# Patient Record
(demographics unavailable — no encounter records)

---

## 2024-11-26 NOTE — PHYSICAL EXAM
[Examination Of The Breasts] : a normal appearance [Normal] : normal [No Masses] : no breast masses were palpable [FreeTextEntry2] : JAYLIN

## 2024-11-26 NOTE — HISTORY OF PRESENT ILLNESS
[FreeTextEntry1] : here for her ANNUAL LAST VISIT WITH ME WAS 7 YEARS AGO HAVING BLEEDING HX OF ABLATION X 2 AND SCARRED ENDOMETRIUM, DIFFICULT TO SAMPLE...2016  LAST ANTONELLA NOTE:  Patient Name	LAKISHA BURRELL (52yo, F) ID# [id number]	Appt. Date/Time	2017 07:15PM 	1964	Service Dept.	[facility] OFFICE Provider	MICHAEL GARCÍA MD Insurance	 Med Primary: [employer] Insurance # : [id number] Policy/Group # : [id number] Prescription: OPTUMCOM - Member is ineligible. Patient found on payor's files, but not covered on date of inquiry. details Chief Complaint 6 MONTH CHECK Patient's Care Team Primary Care Provider: DONTE GARCIA MD: [address] [place] [address], Ph [phone], Fax [phone] NPI: [id number] Patient's Pharmacies [address] (ERX): [address] [place] [address], Ph [phone], Fax [phone] Vitals 2017 07:45 pm Ht:	5 ft 9 in Wt:	203 lbs BMI:	30 BP:	122/76 sitting Allergies Reviewed Allergies CLEOCIN: Diarrhea	 DEMEROL	 ******CANNOT USE HORMONES DUE TO HX OF DVT WHILE ON OC TX****** Medications Reviewed Medications Cleocin 100 mg vaginal suppository Insert 1 suppositor(y/ies) every day by vaginal route at bedtime for 3 days. 16   filled	OPTUMRX clotrimazole-betamethasone 1 %-0.05 % topical cream Apply 2 g twice a day by topical route as directed for 7 days. 16   filled	OPTUMRX ergocalciferol (vitamin D2) 50,000 unit capsule 14   filled	OPTUMRX fluconazole 200 mg tablet Take 1 tablet(s) every day by oral route as directed for 3 days. 16   filled	OPTUMRX fluticasone 50 mcg/actuation nasal spray,suspension 16   filled	OPTUMRX metoprolol succinate ER 50 mg tablet,extended release 24 hr 14   filled	OPTUMRX metoprolol tartrate 25 mg tablet 16   filled	OPTUMRX Metrogel Vaginal 0.75 % Insert 1 applicator(s)ful twice a day by vaginal route for 5 days. 17   prescribed	Michael García MD metroNIDAZOLE 500 mg tablet Take 1 tablet(s) twice a day by oral route as directed for 7 days. 16   filled	OPTUMRX Nasonex 50 mcg/actuation Spray 14   filled	OPTUMRX ofloxacin 0.3 % ear drops 14   filled	OPTUMRX PARoxetine 10 mg tablet Take 1 tablet(s) every day by oral route for 90 days. 17   prescribed	Michael García MD Problems Reviewed Problems Vaginitis and vulvovaginitis Endometriosis of uterus Cyst of ovary Menorrhagia - changing 5-6x per day Irregular periods Urogenital finding Abnormal cervical Papanicolaou smear with positive human papillomavirus deoxyribonucleic acid test Family History Reviewed Family History Mother	- Malignant neoplastic disease (onset age: 63) ( age: 73) - lymphoma (previously recorded as Cancer) Paternal Grandmother	- Malignant tumor of ovary - previously recorded as Ovarian Cancer Father	- Well adult Social History Reviewed Social History Smoking Status: Never smoker Surgical History Surgical History not reviewed (last reviewed 2017) Dilation and Curettage Endometrial Ablation - x2 Other - back and knee GYN History Reviewed GYN History LMP: Approximate. Date of LMP: (Notes: 2015). Obstetric History Reviewed Obstetric History TOTAL	FULL	PRE	AB. I	AB. S	ECTOPICS	MULTIPLE	LIVING 0							 Past Medical History Past Medical History not reviewed (last reviewed 10/17/2016) Asthma: Y Notes: HX OF THROMBOPHLEBITIS RIGHT LEG WHILE ON BIRTH CONTROL PILLS back issues Screening None recorded. HPI PAp  ROS Additionally reports: pap  Physical Exam Patient is a 53-year-old female.  Female Genitalia: Vulva: no masses or lesions and vulvar atrophy. Bladder/Urethra: no urethral discharge or mass and normal meatus and bladder non distended. Vagina no erythema, cystocele, rectocele, abnormal vaginal discharge, or vesicle(s) or ulcers and tenderness; introital stenosis. Cervix: no discharge or cervical motion tenderness and grossly normal; atrophic...expect no ECC. Uterus: normal size and shape and midline, mobile, non-tender, and no uterine prolapse. Adnexa/Parametria: no parametrial tenderness or mass and no adnexal tenderness or ovarian mass.  Breast: Inspection/Palpation: no tenderness, skin changes, abnormal secretions, or distinct masses and normal nipple appearance and non tender axillary lymph nodes.  Abdomen: Auscultation/Inspection/Palpation: no tenderness, hepatomegaly, splenomegaly, masses, or CVA tenderness and soft, non-distended, and normal bowel sounds. Hernia: none palpated.  Constitutional: General Appearance: healthy-appearing, well-nourished, and well-developed.  Psychiatric: Orientation: to time, place, and person. Mood and Affect: normal mood and affect and active and alert.  Skin: Appearance: no rashes or lesions.  Neck: Neck: supple, FROM, trachea midline, and no masses. Thyroid: no enlargement or nodules and non-tender.  Lungs: Respiratory Effort: no intercostal retractions or accessory muscle usage. Auscultation: no wheezing, rales/crackles, or rhonchi and clear to auscultation.  Cardiovascular: Auscultation: RRR and no murmur. Peripheral Vascular: no varicosities, LLE edema, RLE edema, calf tenderness, or palpable cords and pedal pulses intact.  Lymph Nodes: Palpation: non tender submandibular nodes or inguinal nodes.  Rectal Exam: Rectum: normal perianal skin and sphincter tone and no hemorrhoids or masses. Assessment / Plan 1. Abnormal cervical Papanicolaou smear with positive human papillomavirus deoxyribonucleic acid test - paps q 6 mths  R87.619: Unspecified abnormal cytological findings in specimens from cervix uteri  2. Vaginitis and vulvovaginitis - CULTURES DONE, TREAT AS NECESSARY  N76.0: Acute vaginitis  3. Menopausal and postmenopausal disorders N95.9: Unspecified menopausal and perimenopausal disorder   Return to Office to see Michael García MD for Well-Woman Visit at [facility] OFFICE on or around 2018 Encounter Sign-Off Encounter signed-off by Michael García MD, 2017. Encounter performed and documented by Michael García MD Encounter reviewed & signed by Michael García MD on 2017 at 8:03pm

## 2024-11-26 NOTE — PROCEDURE
[FreeTextEntry3] : HX OF 2 ABLATIONS NEEDS SAMPLING WILL BE DIFFICULT DUE TO SCAR TISSUE NO FREE FLUID CERVIX NORMAL [FreeTextEntry5] : 60 CC , 11 MM [FreeTextEntry7] : 2.6 CC [FreeTextEntry8] : 2.9  CC

## 2025-01-14 NOTE — PROCEDURE
[Hysteroscopy] : Hysteroscopy [Time out performed] : Pre-procedure time out performed.  Patient's name, date of birth and procedure confirmed. [Consent Obtained] : Consent obtained [Postmenopausal bleeding] : postmenopausal bleeding [Risks] : risks [Benefits] : benefits [Alternatives] : alternatives [Patient] : patient [Infection] : infection [Bleeding] : bleeding [Allergic Reaction] : allergic reaction [rigid] : Using aseptic technique a hysteroscopy was performed using a rigid hysteroscope [Sent to Pathology] : specimen was placed in buffered formalin and sent for pathology [Hemostasis obtained] : hemostasis obtained [Tolerated Well] : Patient tolerated the procedure well [Aftercare instructions/regstrictions given and follow-up scheduled] : Aftercare instructions/restrictions given and follow-up scheduled [Antibiotics given] : antibiotics not given [de-identified] :    moderate polypoid tissue  os zeina/matt dilators sounded to 8 cm medium sized curette neither ostia visualized...scar tissue due to ablation uncomplicated

## 2025-01-28 NOTE — PHYSICAL EXAM
[Chaperone Present] : A chaperone was present in the examining room during all aspects of the physical examination [[id number]] : A chaperone was present during the pelvic exam. [Labia Majora] : normal [Labia Minora] : normal [Normal] : normal [Uterine Adnexae] : normal [FreeTextEntry2] : kristine

## 2025-01-28 NOTE — PROCEDURE
[Abnormal Uterine Bleeding] : abnormal uterine bleeding [Transvaginal Ultrasound] : transvaginal ultrasound [FreeTextEntry9] : POLYP [FreeTextEntry3] : POLYP; TRACE CIRC: 2.17 CM NO FREE FLUID CERVIX NORMAL [FreeTextEntry5] : 48 CC VOL, 6 MM [FreeTextEntry7] : 1.3 CC [FreeTextEntry8] : 2.2 CC

## 2025-01-28 NOTE — HISTORY OF PRESENT ILLNESS
[FreeTextEntry1] : S/P D AND C  BENIGN POLYP, BUT THERE WAS SCAR TISSUE...SO HERE TO SEE IF THE ENTIRE WPOLYP WAS REMOVED HAD BLEEDING HX OF ABLATION X 2 AND SCARRED ENDOMETRIUM, DIFFICULT TO SAMPLE...  LAST ANTONELLA NOTE:  Patient Name	LAKISHA BURRELL (52yo, F) ID# [id number]	Appt. Date/Time	2017 07:15PM 	1964	Service Dept.	[facility] OFFICE Provider	MICHAEL GARCÍA MD Insurance	 Med Primary: [employer] Insurance # : [id number] Policy/Group # : [id number] Prescription: OPTUMCOM - Member is ineligible. Patient found on payor's files, but not covered on date of inquiry. details Chief Complaint 6 MONTH CHECK Patient's Care Team Primary Care Provider: DONTE GARCIA MD: [address] [place] [address], Ph [phone], Fax [phone] NPI: [id number] Patient's Pharmacies [address] (ERX): [facility] [address], Ph [phone], Fax [phone] Vitals 2017 07:45 pm Ht:	5 ft 9 in Wt:	203 lbs BMI:	30 BP:	122/76 sitting Allergies Reviewed Allergies CLEOCIN: Diarrhea	 DEMEROL	 ******CANNOT USE HORMONES DUE TO HX OF DVT WHILE ON OC TX****** Medications Reviewed Medications Cleocin 100 mg vaginal suppository Insert 1 suppositor(y/ies) every day by vaginal route at bedtime for 3 days. 16   filled	OPTUMRX clotrimazole-betamethasone 1 %-0.05 % topical cream Apply 2 g twice a day by topical route as directed for 7 days. 16   filled	OPTUMRX ergocalciferol (vitamin D2) 50,000 unit capsule 14   filled	OPTUMRX fluconazole 200 mg tablet Take 1 tablet(s) every day by oral route as directed for 3 days. 16   filled	OPTUMRX fluticasone 50 mcg/actuation nasal spray,suspension 16   filled	OPTUMRX metoprolol succinate ER 50 mg tablet,extended release 24 hr 14   filled	OPTUMRX metoprolol tartrate 25 mg tablet 16   filled	OPTUMRX Metrogel Vaginal 0.75 % Insert 1 applicator(s)ful twice a day by vaginal route for 5 days. 17   prescribed	Michael García MD metroNIDAZOLE 500 mg tablet Take 1 tablet(s) twice a day by oral route as directed for 7 days. 16   filled	OPTUMRX Nasonex 50 mcg/actuation Spray 14   filled	OPTUMRX ofloxacin 0.3 % ear drops 14   filled	OPTUMRX PARoxetine 10 mg tablet Take 1 tablet(s) every day by oral route for 90 days. 17   prescribed	Michael García MD Problems Reviewed Problems Vaginitis and vulvovaginitis Endometriosis of uterus Cyst of ovary Menorrhagia - changing 5-6x per day Irregular periods Urogenital finding Abnormal cervical Papanicolaou smear with positive human papillomavirus deoxyribonucleic acid test Family History Reviewed Family History Mother	- Malignant neoplastic disease (onset age: 63) ( age: 73) - lymphoma (previously recorded as Cancer) Paternal Grandmother	- Malignant tumor of ovary - previously recorded as Ovarian Cancer Father	- Well adult Social History Reviewed Social History Smoking Status: Never smoker Surgical History Surgical History not reviewed (last reviewed 2017) Dilation and Curettage Endometrial Ablation - x2 Other - back and knee GYN History Reviewed GYN History LMP: Approximate. Date of LMP: (Notes: 2015). Obstetric History Reviewed Obstetric History TOTAL	FULL	PRE	AB. I	AB. S	ECTOPICS	MULTIPLE	LIVING 0							 Past Medical History Past Medical History not reviewed (last reviewed 10/17/2016) Asthma: Y Notes: HX OF THROMBOPHLEBITIS RIGHT LEG WHILE ON BIRTH CONTROL PILLS back issues Screening None recorded. HPI PAp  ROS Additionally reports: pap  Physical Exam Patient is a 53-year-old female.  Female Genitalia: Vulva: no masses or lesions and vulvar atrophy. Bladder/Urethra: no urethral discharge or mass and normal meatus and bladder non distended. Vagina no erythema, cystocele, rectocele, abnormal vaginal discharge, or vesicle(s) or ulcers and tenderness; introital stenosis. Cervix: no discharge or cervical motion tenderness and grossly normal; atrophic...expect no ECC. Uterus: normal size and shape and midline, mobile, non-tender, and no uterine prolapse. Adnexa/Parametria: no parametrial tenderness or mass and no adnexal tenderness or ovarian mass.  Breast: Inspection/Palpation: no tenderness, skin changes, abnormal secretions, or distinct masses and normal nipple appearance and non tender axillary lymph nodes.  Abdomen: Auscultation/Inspection/Palpation: no tenderness, hepatomegaly, splenomegaly, masses, or CVA tenderness and soft, non-distended, and normal bowel sounds. Hernia: none palpated.  Constitutional: General Appearance: healthy-appearing, well-nourished, and well-developed.  Psychiatric: Orientation: to time, place, and person. Mood and Affect: normal mood and affect and active and alert.  Skin: Appearance: no rashes or lesions.  Neck: Neck: supple, FROM, trachea midline, and no masses. Thyroid: no enlargement or nodules and non-tender.  Lungs: Respiratory Effort: no intercostal retractions or accessory muscle usage. Auscultation: no wheezing, rales/crackles, or rhonchi and clear to auscultation.  Cardiovascular: Auscultation: RRR and no murmur. Peripheral Vascular: no varicosities, LLE edema, RLE edema, calf tenderness, or palpable cords and pedal pulses intact.  Lymph Nodes: Palpation: non tender submandibular nodes or inguinal nodes.  Rectal Exam: Rectum: normal perianal skin and sphincter tone and no hemorrhoids or masses. Assessment / Plan 1. Abnormal cervical Papanicolaou smear with positive human papillomavirus deoxyribonucleic acid test - paps q 6 mths  R87.619: Unspecified abnormal cytological findings in specimens from cervix uteri  2. Vaginitis and vulvovaginitis - CULTURES DONE, TREAT AS NECESSARY  N76.0: Acute vaginitis  3. Menopausal and postmenopausal disorders N95.9: Unspecified menopausal and perimenopausal disorder   Return to Office to see Michael García MD for Well-Woman Visit at [facility] OFFICE on or around 2018 Encounter Sign-Off Encounter signed-off by Michael García MD, 2017. Encounter performed and documented by Michael García MD Encounter reviewed & signed by Michael García MD on 2017 at 8:03pm

## 2025-07-07 NOTE — HISTORY OF PRESENT ILLNESS
[FreeTextEntry1] : S/P D AND C BENIGN POLYP, BUT THERE WAS SCAR TISSUE...SO HERE TO SEE IF THE ENTIRE POLYP WAS REMOVED HAD BLEEDING HX OF ABLATION X 2 AND SCARRED ENDOMETRIUM, DIFFICULT TO SAMPLE.. .2016: REVIEWED OP REPORT TO ENTER...AND COULD NOT GET IN IN THE [place]IMO OR ALWAYS BENIGN SAMPLING.

## 2025-07-07 NOTE — PHYSICAL EXAM
[Chaperoned Physical Exam] : A chaperone was present in the examining room during all aspects of the physical examination. [MA] : MA [Labia Majora] : normal [Labia Minora] : normal [Normal] : normal [Uterine Adnexae] : normal [FreeTextEntry2] : NINI

## 2025-07-07 NOTE — PROCEDURE
[FreeTextEntry3] : TRACE CIRC OF POLYP REMNANT; 2.35 CM....PREVIOUSLY: 2.17 CM...SLIGHT INCREASE. NEED TO MONITOR NO FREE FLUID CERVIX NORMAL [FreeTextEntry5] : 64.7 CC , 4.5 MM THICK [FreeTextEntry7] : 1.0 CC [FreeTextEntry8] : 1.6 CC